# Patient Record
Sex: MALE | URBAN - METROPOLITAN AREA
[De-identification: names, ages, dates, MRNs, and addresses within clinical notes are randomized per-mention and may not be internally consistent; named-entity substitution may affect disease eponyms.]

---

## 2018-12-20 ENCOUNTER — OFFICE VISIT (OUTPATIENT)
Dept: URBAN - METROPOLITAN AREA CLINIC 76 | Facility: CLINIC | Age: 78
End: 2018-12-20
Payer: MEDICARE

## 2018-12-20 DIAGNOSIS — H25.13 AGE-RELATED NUCLEAR CATARACT, BILATERAL: ICD-10-CM

## 2018-12-20 PROCEDURE — 99213 OFFICE O/P EST LOW 20 MIN: CPT | Performed by: OPHTHALMOLOGY

## 2018-12-20 ASSESSMENT — INTRAOCULAR PRESSURE
OD: 22
OS: 20

## 2018-12-20 NOTE — IMPRESSION/PLAN
Impression: Diagnosis: Age-related nuclear cataract, bilateral. Code: H25.13. Plan: Discussed condition and indications for surgery. Consider cat eval next visit if pt would like.

## 2018-12-20 NOTE — IMPRESSION/PLAN
Impression: Diagnosis: Open angle with borderline findings, high risk, bilateral. Code: H40.023. IOP higher today but ok for now. Plan: Discussed condition. Continue Latanoprost QHS OU and Timolol BID OU. Advised compliance. Needs updated tests.

## 2019-04-17 ENCOUNTER — OFFICE VISIT (OUTPATIENT)
Dept: URBAN - METROPOLITAN AREA CLINIC 76 | Facility: CLINIC | Age: 79
End: 2019-04-17
Payer: MEDICARE

## 2019-04-17 DIAGNOSIS — H40.023 OPEN ANGLE WITH BORDERLINE FINDINGS, HIGH RISK, BILATERAL: Primary | ICD-10-CM

## 2019-04-17 PROCEDURE — 92014 COMPRE OPH EXAM EST PT 1/>: CPT | Performed by: OPHTHALMOLOGY

## 2019-04-17 PROCEDURE — 92083 EXTENDED VISUAL FIELD XM: CPT | Performed by: OPHTHALMOLOGY

## 2019-04-17 ASSESSMENT — VISUAL ACUITY
OS: 20/60
OD: 20/40

## 2019-04-17 ASSESSMENT — INTRAOCULAR PRESSURE
OS: 20
OD: 24

## 2019-04-17 NOTE — IMPRESSION/PLAN
Impression: Diagnosis: Age-related nuclear cataract, bilateral. Code: H25.13.
cause for vision changes Plan: Discussed condition and indications for surgery. Consider cat eval next visit if pt would like.

## 2019-04-17 NOTE — IMPRESSION/PLAN
Impression: Diagnosis: Open angle with borderline findings, high risk, bilateral. Code: H40.023. IOP OU higher today, ok for now. VF stable compared to last. Plan: Discussed condition. Continue Latanoprost QHS OU and Timolol BID OU. Advised medication compliance.

## 2022-01-20 ENCOUNTER — OFFICE VISIT (OUTPATIENT)
Dept: URBAN - METROPOLITAN AREA CLINIC 28 | Facility: CLINIC | Age: 82
End: 2022-01-20
Payer: MEDICARE

## 2022-01-20 DIAGNOSIS — H25.813 COMBINED FORMS OF AGE-RELATED CATARACT, BILATERAL: ICD-10-CM

## 2022-01-20 DIAGNOSIS — H35.371 PUCKERING OF MACULA OF RIGHT EYE: ICD-10-CM

## 2022-01-20 DIAGNOSIS — H40.1131 PRIMARY OPEN-ANGLE GLAUCOMA, MILD STAGE, BILATERAL: Primary | ICD-10-CM

## 2022-01-20 DIAGNOSIS — E11.9 TYPE 2 DIABETES MELLITUS WITHOUT COMPLICATIONS: ICD-10-CM

## 2022-01-20 PROCEDURE — 92004 COMPRE OPH EXAM NEW PT 1/>: CPT | Performed by: OPTOMETRIST

## 2022-01-20 PROCEDURE — 92250 FUNDUS PHOTOGRAPHY W/I&R: CPT | Performed by: OPTOMETRIST

## 2022-01-20 PROCEDURE — 92134 CPTRZ OPH DX IMG PST SGM RTA: CPT | Performed by: OPTOMETRIST

## 2022-01-20 PROCEDURE — 92133 CPTRZD OPH DX IMG PST SGM ON: CPT | Performed by: OPTOMETRIST

## 2022-01-20 ASSESSMENT — INTRAOCULAR PRESSURE
OD: 26
OS: 22

## 2022-01-20 NOTE — IMPRESSION/PLAN
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 Plan: Educated on exam findings. Pt has Alzheimer's and has been off drops since before July 2020. Based on exam findings and pt status, recommend next available with Dr. Christina Dakins for combined glaucoma and cataract evaluation. Educated on possibility of combined procedure to control glaucoma without need for eye drops.

## 2022-01-20 NOTE — IMPRESSION/PLAN
Impression: Combined forms of age-related cataract, bilateral: H25.813. Plan: Educated on cataracts and impact on vision. See above plan.

## 2022-01-20 NOTE — IMPRESSION/PLAN
Impression: Puckering of macula of right eye: H35.371. Plan: Educated on mild ERM OD. Not visually significant. Continue to monitor annually.

## 2022-03-07 ENCOUNTER — OFFICE VISIT (OUTPATIENT)
Dept: URBAN - METROPOLITAN AREA CLINIC 24 | Facility: CLINIC | Age: 82
End: 2022-03-07
Payer: MEDICARE

## 2022-03-07 DIAGNOSIS — H40.013 OPEN ANGLE WITH BORDERLINE FINDINGS, LOW RISK, BILATERAL: ICD-10-CM

## 2022-03-07 PROCEDURE — 99204 OFFICE O/P NEW MOD 45 MIN: CPT | Performed by: OPHTHALMOLOGY

## 2022-03-07 PROCEDURE — 92133 CPTRZD OPH DX IMG PST SGM ON: CPT | Performed by: OPHTHALMOLOGY

## 2022-03-07 ASSESSMENT — KERATOMETRY
OS: 42.35
OD: 42.78

## 2022-03-07 ASSESSMENT — INTRAOCULAR PRESSURE
OS: 20
OD: 21

## 2022-03-07 ASSESSMENT — VISUAL ACUITY
OS: 20/200
OD: 20/50

## 2022-03-07 NOTE — IMPRESSION/PLAN
Impression: Open angle with borderline findings, low risk, bilateral: H40.013. Plan: Pt has Glaucoma        Pachs:  508/512    Today's IOP :   21/20      Tmax  : 28/023 Target IOP mid teens or less OU Pt reports Fhx of Glaucoma (mother) Vision equal OU HVF ODL: Full OS Central loss 4/17/19 C/D:  .6x.6 // .4x.4
OCT: 75/79 3/7/22 (Data loss) Pt denies Sulfa Allergy // Pt denies Lung /Heart dx Plan :
1. No sign of glc on exam - enlarged C/D OD > OS
2.  Will continue to monitor without treatment at this time

## 2022-03-07 NOTE — IMPRESSION/PLAN
Impression: Age-related nuclear cataract, bilateral: H25.13. +Alzheimer's Disease  Plan: (( (( First eye OS then OD DISTANCE AIM PLANO OU:  DEXYCU 1st choice, NO ORA OU, NO LenSx OU, No MIGS, NO TORIC (Deferred))) - Block 2/2 mental status [address] (((SUTURE WOUND))) Discussed cataract diagnosis with the patient. Appropriate testing ordered for cataract diagnosis prior to Preop. Risks and benefits of surgical treatment were discussed and understood. Patient desires surgical treatment. Discussed lens options with pt and pt is ok with wearing glasses after surgery. Patient desires surgery to proceed with surgery OS THEN OD. Both eyes examined, medically necessary due to impact in activities of daily living. Discussed higher risks with smaller pupil and discussed iris stretch and higher risks of bleeding.  Discussed there is a chance of developing capsular haze after surgery, which may be corrected with laser/yag

see [facility] to schedule ascan and cataract sx

## 2022-04-14 ENCOUNTER — ADULT PHYSICAL (OUTPATIENT)
Dept: URBAN - METROPOLITAN AREA CLINIC 24 | Facility: CLINIC | Age: 82
End: 2022-04-14
Payer: MEDICARE

## 2022-04-14 DIAGNOSIS — Z01.818 ENCOUNTER FOR OTHER PREPROCEDURAL EXAMINATION: Primary | ICD-10-CM

## 2022-04-14 DIAGNOSIS — H25.13 AGE-RELATED NUCLEAR CATARACT, BILATERAL: Primary | ICD-10-CM

## 2022-04-14 PROCEDURE — 92025 CPTRIZED CORNEAL TOPOGRAPHY: CPT | Performed by: OPHTHALMOLOGY

## 2022-04-14 PROCEDURE — 99203 OFFICE O/P NEW LOW 30 MIN: CPT | Performed by: PHYSICIAN ASSISTANT

## 2022-04-15 ENCOUNTER — OFFICE VISIT (OUTPATIENT)
Dept: URBAN - METROPOLITAN AREA CLINIC 24 | Facility: CLINIC | Age: 82
End: 2022-04-15
Payer: MEDICARE

## 2022-04-15 DIAGNOSIS — H40.1131 PRIMARY OPEN-ANGLE GLAUCOMA, MILD STAGE, BILATERAL: ICD-10-CM

## 2022-04-15 PROCEDURE — 99214 OFFICE O/P EST MOD 30 MIN: CPT | Performed by: OPHTHALMOLOGY

## 2022-04-15 NOTE — IMPRESSION/PLAN
Impression: Age-related nuclear cataract, bilateral: H25.13. +Alzheimer's Disease Plan: (( (( First eye OS then OD DISTANCE AIM PLANO OU:  DEXYCU 1st choice, (+)ORA OU, NO LenSx OU, Hydrus OU, NO TORIC (Deferred))) - Block 2/2 mental status [address] (((SUTURE WOUND))) Discussed cataract diagnosis with the patient. Appropriate testing ordered for cataract diagnosis prior to Preop. Risks and benefits of surgical treatment were discussed and understood. Patient desires surgical treatment. Discussed lens options with pt and pt is ok with wearing glasses after surgery. Patient desires surgery to proceed with surgery OS THEN OD. Both eyes examined, medically necessary due to impact in activities of daily living. Discussed higher risks with smaller pupil and discussed iris stretch and higher risks of bleeding.  Discussed there is a chance of developing capsular haze after surgery, which may be corrected with laser/yag

see [facility] to schedule ascan and cataract sx

## 2022-04-15 NOTE — IMPRESSION/PLAN
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 Plan: Pt has Glaucoma        Pachs:  508/512    Today's IOP :   21/20      Tmax  : 32/32 Target IOP mid teens or less OU Pt reports Fhx of Glaucoma (mother) Vision equal OU HVF ODL: Full OS Central loss 4/17/19 C/D:  .6x.6 // .4x.4
OCT: 75/79 3/7/22 (Data loss) Pt denies Sulfa Allergy // Pt denies Lung /Heart dx Plan :
1. High IOP today. 32 OU 2. Schedule cataract Sx w/ Hydrus OU

## 2022-04-21 ENCOUNTER — SURGERY (OUTPATIENT)
Dept: URBAN - METROPOLITAN AREA SURGERY 12 | Facility: SURGERY | Age: 82
End: 2022-04-21
Payer: MEDICARE

## 2022-04-21 DIAGNOSIS — H40.1131 PRIMARY OPEN-ANGLE GLAUCOMA, BILATERAL, MILD STAGE: ICD-10-CM

## 2022-04-21 DIAGNOSIS — H25.13 AGE-RELATED NUCLEAR CATARACT, BILATERAL: Primary | ICD-10-CM

## 2022-04-21 PROCEDURE — 66991 XCAPSL CTRC RMVL INSJ 1+: CPT | Performed by: OPHTHALMOLOGY

## 2022-04-21 PROCEDURE — PR1CP PR1CP: CUSTOM | Performed by: OPHTHALMOLOGY

## 2022-04-22 ENCOUNTER — POST-OPERATIVE VISIT (OUTPATIENT)
Dept: URBAN - METROPOLITAN AREA CLINIC 24 | Facility: CLINIC | Age: 82
End: 2022-04-22
Payer: MEDICARE

## 2022-04-22 DIAGNOSIS — Z48.810 ENCOUNTER FOR SURGICAL AFTERCARE FOLLOWING SURGERY ON A SENSE ORGAN: Primary | ICD-10-CM

## 2022-04-22 DIAGNOSIS — H25.11 AGE-RELATED NUCLEAR CATARACT, RIGHT EYE: ICD-10-CM

## 2022-04-22 PROCEDURE — 99024 POSTOP FOLLOW-UP VISIT: CPT | Performed by: OPTOMETRIST

## 2022-04-22 ASSESSMENT — INTRAOCULAR PRESSURE: OS: 12

## 2022-04-27 ENCOUNTER — POST-OPERATIVE VISIT (OUTPATIENT)
Dept: URBAN - METROPOLITAN AREA CLINIC 24 | Facility: CLINIC | Age: 82
End: 2022-04-27
Payer: MEDICARE

## 2022-04-27 DIAGNOSIS — Z48.810 ENCOUNTER FOR SURGICAL AFTERCARE FOLLOWING SURGERY ON A SENSE ORGAN: ICD-10-CM

## 2022-04-27 DIAGNOSIS — H52.4 PRESBYOPIA: Primary | ICD-10-CM

## 2022-04-27 PROCEDURE — 99024 POSTOP FOLLOW-UP VISIT: CPT | Performed by: OPTOMETRIST

## 2022-04-27 ASSESSMENT — INTRAOCULAR PRESSURE
OS: 11
OD: 29
OS: 14

## 2022-04-27 ASSESSMENT — VISUAL ACUITY: OS: 20/25

## 2022-04-27 NOTE — IMPRESSION/PLAN
Impression: S/P Cataract Extraction by phacoemulsification with IOL placement/ORA/Hydrus OS - 6 Days. Encounter for surgical aftercare following surgery on a sense organ  Z48.810.  Condition is improving - Plan: Patient happy with outcome of cat sx OS and wishes to proceed with OD

## 2022-05-05 ENCOUNTER — SURGERY (OUTPATIENT)
Dept: URBAN - METROPOLITAN AREA SURGERY 12 | Facility: SURGERY | Age: 82
End: 2022-05-05
Payer: MEDICARE

## 2022-05-05 DIAGNOSIS — H25.11 AGE-RELATED NUCLEAR CATARACT, RIGHT EYE: Primary | ICD-10-CM

## 2022-05-05 DIAGNOSIS — H40.1131 PRIMARY OPEN-ANGLE GLAUCOMA, BILATERAL, MILD STAGE: ICD-10-CM

## 2022-05-05 PROCEDURE — PR1CP PR1CP: CUSTOM | Performed by: OPHTHALMOLOGY

## 2022-05-05 PROCEDURE — 66991 XCAPSL CTRC RMVL INSJ 1+: CPT | Performed by: OPHTHALMOLOGY

## 2022-05-06 ENCOUNTER — POST-OPERATIVE VISIT (OUTPATIENT)
Dept: URBAN - METROPOLITAN AREA CLINIC 24 | Facility: CLINIC | Age: 82
End: 2022-05-06
Payer: MEDICARE

## 2022-05-06 DIAGNOSIS — Z96.1 PRESENCE OF INTRAOCULAR LENS: Primary | ICD-10-CM

## 2022-05-06 PROCEDURE — 99024 POSTOP FOLLOW-UP VISIT: CPT | Performed by: OPTOMETRIST

## 2022-05-06 ASSESSMENT — INTRAOCULAR PRESSURE: OD: 16

## 2022-05-06 NOTE — IMPRESSION/PLAN
Impression: S/P Cataract Extraction by phacoemulsification with IOL placement/ORA; Hydrus OD - 1 Day. Presence of intraocular lens  Z96.1. Plan: PO instructions reviewed.

## 2022-06-07 ENCOUNTER — POST-OPERATIVE VISIT (OUTPATIENT)
Dept: URBAN - METROPOLITAN AREA CLINIC 24 | Facility: CLINIC | Age: 82
End: 2022-06-07
Payer: MEDICARE

## 2022-06-07 DIAGNOSIS — Z96.1 PRESENCE OF INTRAOCULAR LENS: Primary | ICD-10-CM

## 2022-06-07 DIAGNOSIS — H52.4 PRESBYOPIA: ICD-10-CM

## 2022-06-07 PROCEDURE — 99024 POSTOP FOLLOW-UP VISIT: CPT | Performed by: OPTOMETRIST

## 2022-06-07 ASSESSMENT — KERATOMETRY
OS: 42.48
OD: 42.90

## 2022-06-07 ASSESSMENT — INTRAOCULAR PRESSURE
OS: 14
OD: 16

## 2022-06-07 ASSESSMENT — VISUAL ACUITY
OD: 20/25
OS: 20/25

## 2022-06-07 NOTE — IMPRESSION/PLAN
Impression:  Presence of intraocular lens  Z96.1. Plan: Patient happy with outcome OU.  New srx released per patient request.